# Patient Record
Sex: FEMALE | Race: WHITE
[De-identification: names, ages, dates, MRNs, and addresses within clinical notes are randomized per-mention and may not be internally consistent; named-entity substitution may affect disease eponyms.]

---

## 2018-12-05 ENCOUNTER — HOSPITAL ENCOUNTER (EMERGENCY)
Dept: HOSPITAL 80 - FED | Age: 48
Discharge: HOME | End: 2018-12-05
Payer: SELF-PAY

## 2018-12-05 VITALS — DIASTOLIC BLOOD PRESSURE: 99 MMHG | SYSTOLIC BLOOD PRESSURE: 138 MMHG

## 2018-12-05 DIAGNOSIS — R53.83: Primary | ICD-10-CM

## 2018-12-05 DIAGNOSIS — E86.9: ICD-10-CM

## 2018-12-05 DIAGNOSIS — R42: ICD-10-CM

## 2018-12-05 DIAGNOSIS — M62.81: ICD-10-CM

## 2018-12-05 LAB
CK SERPL-CCNC: 48 IU/L (ref 0–156)
PLATELET # BLD: 241 10^3/UL (ref 150–400)

## 2018-12-05 NOTE — CPEKG
Test Reason : OPEN

Blood Pressure : ***/*** mmHG

Vent. Rate : 074 BPM     Atrial Rate : 074 BPM

   P-R Int : 132 ms          QRS Dur : 090 ms

    QT Int : 405 ms       P-R-T Axes : 048 047 020 degrees

   QTc Int : 450 ms

 

Sinus rhythm

 

Confirmed by Heriberto Deng ([id number]) on 12/5/2018 2:18:37 PM

 

Referred By:             Confirmed By:Heriberto Deng

## 2025-01-14 NOTE — EDPHY
H & P


Stated Complaint: weakness, lightheaded, fatigue, bodyaches x 3 days


Time Seen by Provider: 12/05/18 13:31


HPI/ROS: 





CHIEF COMPLAINT:  Weakness, muscle aches





HISTORY OF PRESENT ILLNESS:  The patient is a 48-year-old female who states 

that over the last 3-4 days she has had muscle aches and feeling of generalized 

weakness and fatigue.  She has not had a fever.  No headache.  No chest pain or 

shortness of breath.  No sore throat or runny nose or rashes.  She has felt 

mildly nauseous and felt dizzy 2 days ago when getting out bed but that 

resolved quickly.  No blurry vision or double vision.  No trauma.  No new 

medications.  No substance abuse.  No trauma.  She is concerned about possibly 

having an autoimmune disease.


Severity:  Mild


Modifying factors:  None





REVIEW OF SYSTEMS:


Constitutional:  Fatigue, denies: chills, fever, recent illness, recent injury


EENTM: denies: blurred vision, double vision, nose congestion


Respiratory: denies: cough, shortness of breath


Cardiac: denies: chest pain, irregular heart rate, lightheadedness, palpitations


Gastrointestinal/Abdominal: denies: abdominal pain, diarrhea, nausea, vomiting, 

blood streaked stools


Genitourinary: denies: dysuria, frequency, hematuria, pain


Musculoskeletal:  See HPI


Skin: denies: lesions, rash, jaundice, bruising


Neurological: denies: headache, numbness, paresthesia, tingling, dizziness, 

weakness


Hematologic/Lymphatic: denies: blood clots, easy bleeding, easy bruising


Immunologic/allergic: denies: HIV/AIDS, transplant


 10 systems reviewed and negative except as noted





EXAM:


GENERAL:  Well-appearing, well-nourished and in no acute distress.


HEAD:  Atraumatic, normocephalic.


EYES:  Pupils equal round and reactive to light, extraocular movements intact, 

sclera anicteric, conjunctiva are normal.


ENT:  TMs normal, nares patent, oropharynx clear without exudates.  Moist 

mucous membranes.


NECK:  Normal range of motion, supple without lymphadenopathy or JVD.


LUNGS:  Breath sounds clear to auscultation bilaterally and equal.  No wheezes 

rales or rhonchi.


HEART:  Regular rate and rhythm without murmurs, rubs or gallops.


ABDOMEN:  Soft, nontender, normoactive bowel sounds.  No guarding, no rebound.  

No masses appreciated. 


BACK:  No CVA tenderness, no spinal tenderness, step-offs or deformities


EXTREMITIES:  Normal range of motion, no pitting or edema.  No clubbing or 

cyanosis.


NEUROLOGICAL:  Cranial nerves II through XII grossly intact.  Normal speech, 

normal gait.  5/5 strength, normal movement in all extremities, normal sensation

, normal reflexes


PSYCH:  Tearful,  normal affect.


SKIN:  Warm, dry, normal turgor, no visible rashes or lesions.








Source: Patient


Exam Limitations: No limitations





- Personal History


LMP (Females 10-55): Post Menopausal





- Medical/Surgical History


Hx Asthma: No


Hx Chronic Respiratory Disease: No


Hx Diabetes: No


Hx Cardiac Disease: No


Hx Renal Disease: No


Hx Cirrhosis: No


Hx Alcoholism: No


Hx HIV/AIDS: No


Hx Splenectomy or Spleen Trauma: No


Other PMH: migraines





- Family History


Significant Family History: No pertinent family hx





- Social History


Smoking Status: Never smoked


Alcohol Use: Sober


Drug Use: None


Constitutional: 


 Initial Vital Signs











Temperature (C)  36.5 C   12/05/18 13:20


 


Heart Rate  81   12/05/18 13:20


 


Respiratory Rate  16   12/05/18 13:20


 


Blood Pressure  113/65   12/05/18 13:20


 


O2 Sat (%)  97   12/05/18 13:20








 











O2 Delivery Mode               Room Air














Allergies/Adverse Reactions: 


 





No Known Allergies Allergy (Unverified 12/05/18 13:19)


 








Home Medications: 














 Medication  Instructions  Recorded


 


NK [No Known Home Meds]  12/05/18














Medical Decision Making





- Diagnostics


EKG Interpretation: 





An EKG obtained and was read and documented in trace view.  Please see trace 

view for full reading and report.  Sinus rhythm, no QRS widening, no acute 

ischemic changes 


ED Course/Re-evaluation: 





2:15 p.m. the patient called me into her room because she was concerned that 

her right hand was turning purple and tingling and swelling and white spots.  I 

do not appreciate any abnormalities on exam.  Neither does nursing staff.  She 

was reassured.





3:30 p.m. The patient's lab work is all very reassuring.  She is happy with 

this.  I do not find any other obvious source for her generalized fatigue.  We 

discussed differential diagnosis.  I recommended that she follow up with her 

primary doctor as well as with Rheumatology.  She understands and agrees with 

this plan.  We also discussed indications for returning to the department.


Differential Diagnosis: 





Partial list of the Differential diagnosis considered include but were not 

limited to;  fatigue, depression, anxiety, viral syndrome and although unlikely 

based on the history and physical exam, I also considered mononucleosis, sepsis

, meningitis, MS, lupus.  I discussed these differential diagnoses and the plan 

with the patient as well as the usual and expected course.  The patient 

understands that the diagnosis is provisional and that in medicine we are not 

always correct and that further workup is often warranted.  Usual and customary 

warnings were given.  All of the patient's questions were answered.  The 

patient was instructed to return to the emergency department should the 

symptoms at all worsen or return, otherwise to followup with the physician as 

we discussed.





- Data Points


Laboratory Results: 


 Laboratory Results





 12/05/18 13:44 





 12/05/18 13:44 





 











  12/05/18 12/05/18 12/05/18





  15:03 13:44 13:44


 


WBC      





    


 


RBC      





    


 


Hgb      





    


 


Hct      





    


 


MCV      





    


 


MCH      





    


 


MCHC      





    


 


RDW      





    


 


Plt Count      





    


 


MPV      





    


 


Neut % (Auto)      





    


 


Lymph % (Auto)      





    


 


Mono % (Auto)      





    


 


Eos % (Auto)      





    


 


Baso % (Auto)      





    


 


Nucleat RBC Rel Count      





    


 


Absolute Neuts (auto)      





    


 


Absolute Lymphs (auto)      





    


 


Absolute Monos (auto)      





    


 


Absolute Eos (auto)      





    


 


Absolute Basos (auto)      





    


 


Absolute Nucleated RBC      





    


 


Immature Gran %      





    


 


Immature Gran #      





    


 


Sodium      141 mEq/L mEq/L





     (135-145) 


 


Potassium      4.4 mEq/L mEq/L





     (3.5-5.2) 


 


Chloride      107 mEq/L mEq/L





     () 


 


Carbon Dioxide      25 mEq/l mEq/l





     (22-31) 


 


Anion Gap      9 mEq/L mEq/L





     (6-14) 


 


BUN      22 mg/dL mg/dL





     (7-23) 


 


Creatinine      0.6 mg/dL mg/dL





     (0.6-1.0) 


 


Estimated GFR      > 60 





    


 


Glucose      99 mg/dL mg/dL





     () 


 


Calcium      9.4 mg/dL mg/dL





     (8.5-10.4) 


 


Total Bilirubin      0.6 mg/dL mg/dL





     (0.1-1.4) 


 


Conjugated Bilirubin      0.2 mg/dL mg/dL





     (0.0-0.5) 


 


Unconjugated Bilirubin      0.4 mg/dL mg/dL





     (0.0-1.1) 


 


AST      18 IU/L IU/L





     (14-46) 


 


ALT      21 IU/L IU/L





     (9-52) 


 


Alkaline Phosphatase      44 IU/L IU/L





     () 


 


Creatine Kinase      48 IU/L IU/L





     (0-156) 


 


Total Protein      7.2 g/dL g/dL





     (6.3-8.2) 


 


Albumin      4.6 g/dL g/dL





     (3.5-5.0) 


 


Beta HCG, Qual    NEGATIVE   





    


 


Urine Color  PALE YELLOW     





    


 


Urine Appearance  CLEAR     





    


 


Urine pH  7.0     





   (5.0-7.5)   


 


Ur Specific Gravity  1.005     





   (1.002-1.030)   


 


Urine Protein  NEGATIVE     





   (NEGATIVE)   


 


Urine Ketones  NEGATIVE     





   (NEGATIVE)   


 


Urine Blood  NEGATIVE     





   (NEGATIVE)   


 


Urine Nitrate  NEGATIVE     





   (NEGATIVE)   


 


Urine Bilirubin  NEGATIVE     





   (NEGATIVE)   


 


Urine Urobilinogen  NEGATIVE EU EU    





   (0.2-1.0)   


 


Ur Leukocyte Esterase  NEGATIVE     





   (NEGATIVE)   


 


Urine RBC  NONE SEEN /hpf /hpf    





   (0-3)   


 


Urine WBC  1-3 /hpf /hpf    





   (0-3)   


 


Ur Epithelial Cells  1+ /lpf /lpf    





   (NONE-1+)   


 


Urine Glucose  NEGATIVE     





   (NEGATIVE)   














  12/05/18





  13:44


 


WBC  5.04 10^3/uL 10^3/uL





   (3.80-9.50) 


 


RBC  4.74 10^6/uL 10^6/uL





   (4.18-5.33) 


 


Hgb  14.3 g/dL g/dL





   (12.6-16.3) 


 


Hct  43.7 % %





   (38.0-47.0) 


 


MCV  92.2 fL fL





   (81.5-99.8) 


 


MCH  30.2 pg pg





   (27.9-34.1) 


 


MCHC  32.7 g/dL g/dL





   (32.4-36.7) 


 


RDW  12.6 % %





   (11.5-15.2) 


 


Plt Count  241 10^3/uL 10^3/uL





   (150-400) 


 


MPV  10.1 fL fL





   (8.7-11.7) 


 


Neut % (Auto)  56.8 % %





   (39.3-74.2) 


 


Lymph % (Auto)  34.9 % %





   (15.0-45.0) 


 


Mono % (Auto)  7.1 % %





   (4.5-13.0) 


 


Eos % (Auto)  0.8 % %





   (0.6-7.6) 


 


Baso % (Auto)  0.4 % %





   (0.3-1.7) 


 


Nucleat RBC Rel Count  0.0 % %





   (0.0-0.2) 


 


Absolute Neuts (auto)  2.86 10^3/uL 10^3/uL





   (1.70-6.50) 


 


Absolute Lymphs (auto)  1.76 10^3/uL 10^3/uL





   (1.00-3.00) 


 


Absolute Monos (auto)  0.36 10^3/uL 10^3/uL





   (0.30-0.80) 


 


Absolute Eos (auto)  0.04 10^3/uL 10^3/uL





   (0.03-0.40) 


 


Absolute Basos (auto)  0.02 10^3/uL 10^3/uL





   (0.02-0.10) 


 


Absolute Nucleated RBC  0.00 10^3/uL 10^3/uL





   (0-0.01) 


 


Immature Gran %  0.0 % %





   (0.0-1.1) 


 


Immature Gran #  0.00 10^3/uL 10^3/uL





   (0.00-0.10) 


 


Sodium  





  


 


Potassium  





  


 


Chloride  





  


 


Carbon Dioxide  





  


 


Anion Gap  





  


 


BUN  





  


 


Creatinine  





  


 


Estimated GFR  





  


 


Glucose  





  


 


Calcium  





  


 


Total Bilirubin  





  


 


Conjugated Bilirubin  





  


 


Unconjugated Bilirubin  





  


 


AST  





  


 


ALT  





  


 


Alkaline Phosphatase  





  


 


Creatine Kinase  





  


 


Total Protein  





  


 


Albumin  





  


 


Beta HCG, Qual  





  


 


Urine Color  





  


 


Urine Appearance  





  


 


Urine pH  





  


 


Ur Specific Gravity  





  


 


Urine Protein  





  


 


Urine Ketones  





  


 


Urine Blood  





  


 


Urine Nitrate  





  


 


Urine Bilirubin  





  


 


Urine Urobilinogen  





  


 


Ur Leukocyte Esterase  





  


 


Urine RBC  





  


 


Urine WBC  





  


 


Ur Epithelial Cells  





  


 


Urine Glucose  





  











Medications Given: 


 








Discontinued Medications





Sodium Chloride (Ns)  1,000 mls @ 0 mls/hr IV EDNOW ONE; Wide Open


   PRN Reason: Protocol


   Stop: 12/05/18 13:37


   Last Admin: 12/05/18 13:47 Dose:  1,000 mls








Departure





- Departure


Disposition: Home, Routine, Self-Care


Clinical Impression: 


Fatigue


Qualifiers:


 Fatigue type: unspecified Qualified Code(s): R53.83 - Other fatigue





Condition: Good


Instructions:  Fatigue (ED)


Referrals: 


Luis M Kamara MD [[facility] Primary Care Provider] - 5-7 days, if not improved


Lavonne Rosenberg MD [Medical Doctor] - 2-3 days, call for appt. RT Called